# Patient Record
Sex: FEMALE | Race: OTHER | Employment: UNEMPLOYED | URBAN - METROPOLITAN AREA
[De-identification: names, ages, dates, MRNs, and addresses within clinical notes are randomized per-mention and may not be internally consistent; named-entity substitution may affect disease eponyms.]

---

## 2020-06-22 ENCOUNTER — HOSPITAL ENCOUNTER (OUTPATIENT)
Dept: GENERAL RADIOLOGY | Facility: HOSPITAL | Age: 55
Discharge: HOME | End: 2020-06-22
Attending: CHIROPRACTOR

## 2020-06-22 DIAGNOSIS — M25.561 RIGHT KNEE PAIN, UNSPECIFIED CHRONICITY: ICD-10-CM

## 2020-06-22 DIAGNOSIS — M25.551 RIGHT HIP PAIN: ICD-10-CM

## 2020-06-22 PROCEDURE — 73560 X-RAY EXAM OF KNEE 1 OR 2: CPT | Performed by: CHIROPRACTOR

## 2020-06-22 PROCEDURE — 73502 X-RAY EXAM HIP UNI 2-3 VIEWS: CPT | Performed by: CHIROPRACTOR

## 2020-06-26 ENCOUNTER — TELEPHONE (OUTPATIENT)
Dept: ORTHOPEDICS CLINIC | Facility: CLINIC | Age: 55
End: 2020-06-26

## 2020-06-26 NOTE — TELEPHONE ENCOUNTER
Patient being referred from Dr. Usman Choe for right hip pain, Patient had x-rays done on 6.22.20.  Please advise

## 2020-07-08 ENCOUNTER — OFFICE VISIT (OUTPATIENT)
Dept: ORTHOPEDICS CLINIC | Facility: CLINIC | Age: 55
End: 2020-07-08

## 2020-07-08 VITALS — HEART RATE: 87 BPM | OXYGEN SATURATION: 98 %

## 2020-07-08 DIAGNOSIS — M16.11 PRIMARY OSTEOARTHRITIS OF RIGHT HIP: Primary | ICD-10-CM

## 2020-07-08 PROCEDURE — 20611 DRAIN/INJ JOINT/BURSA W/US: CPT | Performed by: ORTHOPAEDIC SURGERY

## 2020-07-08 PROCEDURE — 99203 OFFICE O/P NEW LOW 30 MIN: CPT | Performed by: ORTHOPAEDIC SURGERY

## 2020-07-08 RX ORDER — TRIAMCINOLONE ACETONIDE 40 MG/ML
40 INJECTION, SUSPENSION INTRA-ARTICULAR; INTRAMUSCULAR ONCE
Status: COMPLETED | OUTPATIENT
Start: 2020-07-08 | End: 2020-07-08

## 2020-07-08 RX ORDER — CELECOXIB 200 MG/1
200 CAPSULE ORAL DAILY
COMMUNITY
Start: 2020-06-30 | End: 2021-01-07 | Stop reason: ALTCHOICE

## 2020-07-08 RX ORDER — AMITRIPTYLINE HYDROCHLORIDE 25 MG/1
25 TABLET, FILM COATED ORAL NIGHTLY
COMMUNITY
Start: 2020-06-30 | End: 2021-01-07 | Stop reason: ALTCHOICE

## 2020-07-08 RX ADMIN — TRIAMCINOLONE ACETONIDE 40 MG: 40 INJECTION, SUSPENSION INTRA-ARTICULAR; INTRAMUSCULAR at 17:50:00

## 2020-07-08 NOTE — PROCEDURES
After informed consent, the patient's right hip was marked, prepped with topical antiseptic, and locally anesthetized with skin refrigerant followed by injection of 1% lidocaine to create a skin wheal anteriorly at an insertion site a few fingerbreadths la

## 2020-07-08 NOTE — H&P
[facility] New Patient Note    CC: Patient presents with:  Hip Pain: c/o right hip pain       HPI: This 53year old female presents today with complaints of hip pain.   Patient reports that the pain has been present for the past few months, points to extremity:  • Inspection: skin intact, no lesions about the hip  • Palpation: Nontender palpation greater trochanter/bursa and IT band  • Range of motion: Passive hip flexion to 90 degrees with internal rotation just past 10 and external rotation to 10 cau Louis, which she started about a week ago. I did advise continuation of therapeutic exercise, stretching and strengthening as pain allows. We did discuss also the possibility of an intra-articular hip injection. They understand and agree to this.   Hip

## 2020-07-10 ENCOUNTER — TELEPHONE (OUTPATIENT)
Dept: ORTHOPEDICS CLINIC | Facility: CLINIC | Age: 55
End: 2020-07-10

## 2020-07-10 NOTE — TELEPHONE ENCOUNTER
Patient received hip injection on Wednesday 7.8. 20 with some relief but states the pain is back and is having issues walking again.  Please advise

## 2020-09-16 ENCOUNTER — OFFICE VISIT (OUTPATIENT)
Dept: ORTHOPEDICS CLINIC | Facility: CLINIC | Age: 55
End: 2020-09-16

## 2020-09-16 VITALS — OXYGEN SATURATION: 100 % | HEART RATE: 86 BPM

## 2020-09-16 DIAGNOSIS — M16.11 PRIMARY OSTEOARTHRITIS OF RIGHT HIP: Primary | ICD-10-CM

## 2020-09-16 PROCEDURE — 99213 OFFICE O/P EST LOW 20 MIN: CPT | Performed by: ORTHOPAEDIC SURGERY

## 2020-09-16 RX ORDER — CELECOXIB 200 MG/1
200 CAPSULE ORAL DAILY
Qty: 30 CAPSULE | Refills: 1 | Status: SHIPPED | OUTPATIENT
Start: 2020-09-16 | End: 2020-11-15

## 2020-09-16 NOTE — PROGRESS NOTES
[facility] Progress Note    Subjective: Patient returns for evaluation of right hip. She states that the injection we performed gave near 100% relief for 1 month. She was also taking Celebrex, but ran out 1 month ago.   She has been unable to get a appointment for a few weeks from now which would be 3 months since the date of the last injection. She and her son understand and agree to this plan.     MD Mick [facility]

## 2020-10-09 ENCOUNTER — OFFICE VISIT (OUTPATIENT)
Dept: ORTHOPEDICS CLINIC | Facility: CLINIC | Age: 55
End: 2020-10-09

## 2020-10-09 VITALS — SYSTOLIC BLOOD PRESSURE: 136 MMHG | DIASTOLIC BLOOD PRESSURE: 80 MMHG | HEART RATE: 76 BPM | OXYGEN SATURATION: 97 %

## 2020-10-09 DIAGNOSIS — M16.11 PRIMARY OSTEOARTHRITIS OF RIGHT HIP: Primary | ICD-10-CM

## 2020-10-09 PROCEDURE — 20611 DRAIN/INJ JOINT/BURSA W/US: CPT | Performed by: ORTHOPAEDIC SURGERY

## 2020-10-09 RX ORDER — TRIAMCINOLONE ACETONIDE 40 MG/ML
40 INJECTION, SUSPENSION INTRA-ARTICULAR; INTRAMUSCULAR ONCE
Status: COMPLETED | OUTPATIENT
Start: 2020-10-09 | End: 2020-10-09

## 2020-10-09 RX ADMIN — TRIAMCINOLONE ACETONIDE 40 MG: 40 INJECTION, SUSPENSION INTRA-ARTICULAR; INTRAMUSCULAR at 16:25:00

## 2020-11-12 ENCOUNTER — TELEPHONE (OUTPATIENT)
Dept: ORTHOPEDICS CLINIC | Facility: CLINIC | Age: 55
End: 2020-11-12

## 2020-11-12 NOTE — TELEPHONE ENCOUNTER
Pt son is calling her regarding her right hip pain now she is having issues on the front of the leg/ Please advise

## 2020-11-12 NOTE — TELEPHONE ENCOUNTER
Spoke with Moose Roa, pt's son, who stated that pt has been experiencing pain near the front of the hip for the past month. Pt has stopped taking Celebrex, but stated that it was helping much anymore. Pt is icing at night.  Pt stated the pain is throbbing, and

## 2020-11-15 NOTE — TELEPHONE ENCOUNTER
If she would like to come back in for evaluation/discussion we can set her up. If she had some relief from the last injection, even if short-lived, this would be helpful to confirm that the hip joint is the source of her pain.  If it is, discussion is 1)

## 2020-11-16 NOTE — TELEPHONE ENCOUNTER
Attempted to call Jessica Prince son [facility] regarding follow up on Dr. Waldemar Ash recommendations. Reached voicemail, left voicemail and provided a detail message with my call back contact information.

## 2020-12-01 ENCOUNTER — MED REC SCAN ONLY (OUTPATIENT)
Dept: ORTHOPEDICS CLINIC | Facility: CLINIC | Age: 55
End: 2020-12-01

## 2020-12-11 ENCOUNTER — TELEPHONE (OUTPATIENT)
Dept: ORTHOPEDICS CLINIC | Facility: CLINIC | Age: 55
End: 2020-12-11

## 2020-12-11 ENCOUNTER — OFFICE VISIT (OUTPATIENT)
Dept: ORTHOPEDICS CLINIC | Facility: CLINIC | Age: 55
End: 2020-12-11

## 2020-12-11 ENCOUNTER — HOSPITAL ENCOUNTER (OUTPATIENT)
Dept: GENERAL RADIOLOGY | Age: 55
Discharge: HOME | End: 2020-12-11
Attending: ORTHOPAEDIC SURGERY

## 2020-12-11 VITALS — BODY MASS INDEX: 26.29 KG/M2 | HEART RATE: 76 BPM | WEIGHT: 154 LBS | HEIGHT: 64 IN | OXYGEN SATURATION: 99 %

## 2020-12-11 DIAGNOSIS — M16.11 PRIMARY OSTEOARTHRITIS OF RIGHT HIP: Primary | ICD-10-CM

## 2020-12-11 DIAGNOSIS — M16.11 PRIMARY OSTEOARTHRITIS OF RIGHT HIP: ICD-10-CM

## 2020-12-11 PROCEDURE — 73502 X-RAY EXAM HIP UNI 2-3 VIEWS: CPT | Performed by: ORTHOPAEDIC SURGERY

## 2020-12-11 PROCEDURE — 3008F BODY MASS INDEX DOCD: CPT | Performed by: ORTHOPAEDIC SURGERY

## 2020-12-11 PROCEDURE — 99213 OFFICE O/P EST LOW 20 MIN: CPT | Performed by: ORTHOPAEDIC SURGERY

## 2020-12-11 NOTE — TELEPHONE ENCOUNTER
She is currently taking celecoxib (celebrex)  We can try a different antiinflammatory - meloxicam - if she would like.  It is the same class of medication as celebrex so she would have to stop the celebrex to take meloxicam

## 2020-12-11 NOTE — PROGRESS NOTES
[facility] Progress Note    Subjective: Patient returns for discussion of her right hip. She states that the pain is still bothering her significantly. Last injection given 2 months ago gave 40% relief that is still lasting to this point.   She also alleviate symptoms sufficiently. The patient has reasonably attempted nonsurgical treatments as mentioned above and remains limited in activities of daily living secondary to pain from hip arthritis.   I discussed risks and benefits of surgery, including b

## 2020-12-11 NOTE — TELEPHONE ENCOUNTER
Patient's son forgot to ask at his mother's appt today for a stronger pain med for her to use until surgery. The current pain meds are not working. Please call.

## 2020-12-14 NOTE — TELEPHONE ENCOUNTER
Called patient and son answer he's on Hippa , explained to him that meloxicam can be sent to the pharmacy for her to take instead of the Celebrex. patient's son is okay with that Patients's son understood.  Patient son stated she's in a lot of pain and would

## 2020-12-15 ENCOUNTER — TELEPHONE (OUTPATIENT)
Dept: ORTHOPEDICS CLINIC | Facility: CLINIC | Age: 55
End: 2020-12-15

## 2020-12-15 NOTE — TELEPHONE ENCOUNTER
Spoke with patient's son Will Fu, and notified him that Dr. Aliza Canales was still in the OR.  Pt's son notified that Dr. Aliza Canales has been made aware of the medication request. Pt's son clarified that the referral for the dentist is actually a dental clearance

## 2020-12-15 NOTE — TELEPHONE ENCOUNTER
Please fax a referral to the patient's dentist @ [phone]. Patient is to be scheduled for SX in January once her dentist clears her. Patient's on is also wondering if she can get a stronger pain medication.   The patient is in pain and is having a l

## 2020-12-16 RX ORDER — MELOXICAM 7.5 MG/1
7.5 TABLET ORAL DAILY
Qty: 30 TABLET | Refills: 0 | Status: SHIPPED | OUTPATIENT
Start: 2020-12-16 | End: 2021-01-15

## 2020-12-16 NOTE — TELEPHONE ENCOUNTER
Called patient to inform her RX has been sent to the pharmacy and its to take instead of the celebrex. Patient's son answered. Patient's son is looking for a code to get a quote for hip surgery.  Patient son also would like to know if his mother brings her

## 2020-12-28 ENCOUNTER — OFFICE VISIT (OUTPATIENT)
Dept: ORTHOPEDICS CLINIC | Facility: CLINIC | Age: 55
End: 2020-12-28

## 2020-12-28 VITALS — OXYGEN SATURATION: 100 % | HEART RATE: 76 BPM

## 2020-12-28 DIAGNOSIS — M16.11 PRIMARY OSTEOARTHRITIS OF RIGHT HIP: Primary | ICD-10-CM

## 2020-12-28 PROCEDURE — 99213 OFFICE O/P EST LOW 20 MIN: CPT | Performed by: ORTHOPAEDIC SURGERY

## 2020-12-28 NOTE — PROGRESS NOTES
Surgery Scheduling Sheet for Total Hip Arthroplasty  Name: Shu Pittman  : 1965    Procedure: Right AnteriorTotal Hip Arthroplasty   Diagnosis: Right Hip Arthritis   CPT Code: 07545  Anesthesia: Choice  Length of Surgery: 2 hours  Instruments:

## 2020-12-28 NOTE — PROGRESS NOTES
[facility] Progress Note    Subjective: Patient returns for discussion of hip replacement surgery on the right. She states that the pain remains activity limiting.   Pain is in the groin, radiates the proximal lateral thigh as well as down the anteri uploaded to her chart. We will obtain some additional labs including MRSA. She is looking to proceed with surgery at the end of January. Preoperative binder was reviewed with the patient today. All questions were answered for her and her son.   We will

## 2021-01-07 RX ORDER — ACETAMINOPHEN 500 MG
1000 TABLET ORAL ONCE
Status: CANCELLED | OUTPATIENT
Start: 2021-01-07 | End: 2021-01-07

## 2021-01-23 ENCOUNTER — LABORATORY ENCOUNTER (OUTPATIENT)
Dept: LAB | Age: 56
End: 2021-01-23
Attending: ORTHOPAEDIC SURGERY
Payer: COMMERCIAL

## 2021-01-23 ENCOUNTER — LAB ENCOUNTER (OUTPATIENT)
Dept: LAB | Age: 56
End: 2021-01-23
Attending: ORTHOPAEDIC SURGERY
Payer: COMMERCIAL

## 2021-01-23 DIAGNOSIS — Z01.818 PRE-OP TESTING: ICD-10-CM

## 2021-01-23 LAB
ANTIBODY SCREEN: NEGATIVE
RH BLOOD TYPE: POSITIVE

## 2021-01-23 PROCEDURE — 86850 RBC ANTIBODY SCREEN: CPT

## 2021-01-23 PROCEDURE — 86901 BLOOD TYPING SEROLOGIC RH(D): CPT

## 2021-01-23 PROCEDURE — 86900 BLOOD TYPING SEROLOGIC ABO: CPT

## 2021-01-25 ENCOUNTER — ANESTHESIA EVENT (OUTPATIENT)
Dept: SURGERY | Facility: HOSPITAL | Age: 56
DRG: 470 | End: 2021-01-25
Payer: COMMERCIAL

## 2021-01-25 LAB — SARS-COV-2 RNA RESP QL NAA+PROBE: NOT DETECTED

## 2021-01-25 RX ORDER — MELOXICAM 7.5 MG/1
7.5 TABLET ORAL DAILY
Status: ON HOLD | COMMUNITY
End: 2021-01-27

## 2021-01-26 ENCOUNTER — ANESTHESIA (OUTPATIENT)
Dept: SURGERY | Facility: HOSPITAL | Age: 56
DRG: 470 | End: 2021-01-26
Payer: COMMERCIAL

## 2021-01-26 ENCOUNTER — HOSPITAL ENCOUNTER (INPATIENT)
Facility: HOSPITAL | Age: 56
LOS: 1 days | Discharge: HOME HEALTH | DRG: 470 | End: 2021-01-27
Attending: ORTHOPAEDIC SURGERY | Admitting: ORTHOPAEDIC SURGERY
Payer: COMMERCIAL

## 2021-01-26 ENCOUNTER — APPOINTMENT (OUTPATIENT)
Dept: GENERAL RADIOLOGY | Facility: HOSPITAL | Age: 56
DRG: 470 | End: 2021-01-26
Attending: ORTHOPAEDIC SURGERY
Payer: COMMERCIAL

## 2021-01-26 ENCOUNTER — APPOINTMENT (OUTPATIENT)
Dept: GENERAL RADIOLOGY | Facility: HOSPITAL | Age: 56
DRG: 470 | End: 2021-01-26
Attending: NURSE PRACTITIONER
Payer: COMMERCIAL

## 2021-01-26 DIAGNOSIS — Z01.818 PRE-OP TESTING: Primary | ICD-10-CM

## 2021-01-26 DIAGNOSIS — M16.11 PRIMARY OSTEOARTHRITIS OF RIGHT HIP: ICD-10-CM

## 2021-01-26 PROCEDURE — 99252 IP/OBS CONSLTJ NEW/EST SF 35: CPT | Performed by: STUDENT IN AN ORGANIZED HEALTH CARE EDUCATION/TRAINING PROGRAM

## 2021-01-26 PROCEDURE — 27130 TOTAL HIP ARTHROPLASTY: CPT | Performed by: NURSE PRACTITIONER

## 2021-01-26 PROCEDURE — 73501 X-RAY EXAM HIP UNI 1 VIEW: CPT | Performed by: NURSE PRACTITIONER

## 2021-01-26 PROCEDURE — 0SR90JA REPLACEMENT OF RIGHT HIP JOINT WITH SYNTHETIC SUBSTITUTE, UNCEMENTED, OPEN APPROACH: ICD-10-PCS | Performed by: ORTHOPAEDIC SURGERY

## 2021-01-26 PROCEDURE — 76000 FLUOROSCOPY <1 HR PHYS/QHP: CPT | Performed by: ORTHOPAEDIC SURGERY

## 2021-01-26 PROCEDURE — 3E0R3BZ INTRODUCTION OF ANESTHETIC AGENT INTO SPINAL CANAL, PERCUTANEOUS APPROACH: ICD-10-PCS | Performed by: ANESTHESIOLOGY

## 2021-01-26 PROCEDURE — 27130 TOTAL HIP ARTHROPLASTY: CPT | Performed by: ORTHOPAEDIC SURGERY

## 2021-01-26 DEVICE — BONE SCREW 6.5X40 SELF-TAP: Type: IMPLANTABLE DEVICE | Site: HIP | Status: FUNCTIONAL

## 2021-01-26 DEVICE — BONE SCREW 6.5X20 SELF-TAP: Type: IMPLANTABLE DEVICE | Site: HIP | Status: FUNCTIONAL

## 2021-01-26 RX ORDER — SENNOSIDES 8.6 MG
17.2 TABLET ORAL NIGHTLY
Status: DISCONTINUED | OUTPATIENT
Start: 2021-01-26 | End: 2021-01-27

## 2021-01-26 RX ORDER — HYDROMORPHONE HYDROCHLORIDE 1 MG/ML
0.4 INJECTION, SOLUTION INTRAMUSCULAR; INTRAVENOUS; SUBCUTANEOUS EVERY 5 MIN PRN
Status: ACTIVE | OUTPATIENT
Start: 2021-01-26 | End: 2021-01-26

## 2021-01-26 RX ORDER — SODIUM CHLORIDE, SODIUM LACTATE, POTASSIUM CHLORIDE, CALCIUM CHLORIDE 600; 310; 30; 20 MG/100ML; MG/100ML; MG/100ML; MG/100ML
INJECTION, SOLUTION INTRAVENOUS CONTINUOUS
Status: DISCONTINUED | OUTPATIENT
Start: 2021-01-26 | End: 2021-01-27

## 2021-01-26 RX ORDER — NALOXONE HYDROCHLORIDE 0.4 MG/ML
80 INJECTION, SOLUTION INTRAMUSCULAR; INTRAVENOUS; SUBCUTANEOUS AS NEEDED
Status: DISCONTINUED | OUTPATIENT
Start: 2021-01-26 | End: 2021-01-26 | Stop reason: HOSPADM

## 2021-01-26 RX ORDER — MIDAZOLAM HYDROCHLORIDE 1 MG/ML
1 INJECTION INTRAMUSCULAR; INTRAVENOUS EVERY 5 MIN PRN
Status: DISCONTINUED | OUTPATIENT
Start: 2021-01-26 | End: 2021-01-26 | Stop reason: HOSPADM

## 2021-01-26 RX ORDER — MIDAZOLAM HYDROCHLORIDE 1 MG/ML
INJECTION INTRAMUSCULAR; INTRAVENOUS AS NEEDED
Status: DISCONTINUED | OUTPATIENT
Start: 2021-01-26 | End: 2021-01-26 | Stop reason: SURG

## 2021-01-26 RX ORDER — ASPIRIN 325 MG
325 TABLET, DELAYED RELEASE (ENTERIC COATED) ORAL 2 TIMES DAILY
Status: DISCONTINUED | OUTPATIENT
Start: 2021-01-27 | End: 2021-01-27

## 2021-01-26 RX ORDER — DOCUSATE SODIUM 100 MG/1
100 CAPSULE, LIQUID FILLED ORAL 2 TIMES DAILY
Status: DISCONTINUED | OUTPATIENT
Start: 2021-01-26 | End: 2021-01-27

## 2021-01-26 RX ORDER — LIDOCAINE HYDROCHLORIDE 10 MG/ML
INJECTION, SOLUTION EPIDURAL; INFILTRATION; INTRACAUDAL; PERINEURAL AS NEEDED
Status: DISCONTINUED | OUTPATIENT
Start: 2021-01-26 | End: 2021-01-26 | Stop reason: SURG

## 2021-01-26 RX ORDER — TRANEXAMIC ACID 10 MG/ML
1000 INJECTION, SOLUTION INTRAVENOUS ONCE
Status: COMPLETED | OUTPATIENT
Start: 2021-01-26 | End: 2021-01-26

## 2021-01-26 RX ORDER — HYDROMORPHONE HYDROCHLORIDE 1 MG/ML
0.4 INJECTION, SOLUTION INTRAMUSCULAR; INTRAVENOUS; SUBCUTANEOUS EVERY 2 HOUR PRN
Status: DISCONTINUED | OUTPATIENT
Start: 2021-01-26 | End: 2021-01-27

## 2021-01-26 RX ORDER — ACETAMINOPHEN 500 MG
1000 TABLET ORAL ONCE AS NEEDED
Status: DISCONTINUED | OUTPATIENT
Start: 2021-01-26 | End: 2021-01-26 | Stop reason: HOSPADM

## 2021-01-26 RX ORDER — DIPHENHYDRAMINE HYDROCHLORIDE 50 MG/ML
12.5 INJECTION INTRAMUSCULAR; INTRAVENOUS EVERY 4 HOURS PRN
Status: DISCONTINUED | OUTPATIENT
Start: 2021-01-26 | End: 2021-01-27

## 2021-01-26 RX ORDER — ACETAMINOPHEN 325 MG/1
TABLET ORAL
Status: COMPLETED
Start: 2021-01-26 | End: 2021-01-26

## 2021-01-26 RX ORDER — BISACODYL 10 MG
10 SUPPOSITORY, RECTAL RECTAL
Status: DISCONTINUED | OUTPATIENT
Start: 2021-01-26 | End: 2021-01-27

## 2021-01-26 RX ORDER — DEXAMETHASONE SODIUM PHOSPHATE 4 MG/ML
VIAL (ML) INJECTION AS NEEDED
Status: DISCONTINUED | OUTPATIENT
Start: 2021-01-26 | End: 2021-01-26 | Stop reason: SURG

## 2021-01-26 RX ORDER — CEFAZOLIN SODIUM/WATER 2 G/20 ML
2 SYRINGE (ML) INTRAVENOUS ONCE
Status: COMPLETED | OUTPATIENT
Start: 2021-01-26 | End: 2021-01-26

## 2021-01-26 RX ORDER — POLYETHYLENE GLYCOL 3350 17 G/17G
17 POWDER, FOR SOLUTION ORAL DAILY PRN
Status: DISCONTINUED | OUTPATIENT
Start: 2021-01-26 | End: 2021-01-27

## 2021-01-26 RX ORDER — DIPHENHYDRAMINE HCL 25 MG
25 CAPSULE ORAL EVERY 4 HOURS PRN
Status: DISCONTINUED | OUTPATIENT
Start: 2021-01-26 | End: 2021-01-27

## 2021-01-26 RX ORDER — MEPERIDINE HYDROCHLORIDE 25 MG/ML
12.5 INJECTION INTRAMUSCULAR; INTRAVENOUS; SUBCUTANEOUS AS NEEDED
Status: DISCONTINUED | OUTPATIENT
Start: 2021-01-26 | End: 2021-01-26 | Stop reason: HOSPADM

## 2021-01-26 RX ORDER — ACETAMINOPHEN 325 MG/1
650 TABLET ORAL 4 TIMES DAILY
Status: DISCONTINUED | OUTPATIENT
Start: 2021-01-26 | End: 2021-01-27

## 2021-01-26 RX ORDER — DEXAMETHASONE SODIUM PHOSPHATE 10 MG/ML
8 INJECTION, SOLUTION INTRAMUSCULAR; INTRAVENOUS ONCE
Status: COMPLETED | OUTPATIENT
Start: 2021-01-27 | End: 2021-01-27

## 2021-01-26 RX ORDER — ONDANSETRON 2 MG/ML
4 INJECTION INTRAMUSCULAR; INTRAVENOUS AS NEEDED
Status: DISCONTINUED | OUTPATIENT
Start: 2021-01-26 | End: 2021-01-26 | Stop reason: HOSPADM

## 2021-01-26 RX ORDER — SODIUM CHLORIDE, SODIUM LACTATE, POTASSIUM CHLORIDE, CALCIUM CHLORIDE 600; 310; 30; 20 MG/100ML; MG/100ML; MG/100ML; MG/100ML
INJECTION, SOLUTION INTRAVENOUS CONTINUOUS
Status: DISCONTINUED | OUTPATIENT
Start: 2021-01-26 | End: 2021-01-26 | Stop reason: HOSPADM

## 2021-01-26 RX ORDER — PROCHLORPERAZINE EDISYLATE 5 MG/ML
10 INJECTION INTRAMUSCULAR; INTRAVENOUS EVERY 6 HOURS PRN
Status: DISCONTINUED | OUTPATIENT
Start: 2021-01-26 | End: 2021-01-27

## 2021-01-26 RX ORDER — HYDROMORPHONE HYDROCHLORIDE 1 MG/ML
0.4 INJECTION, SOLUTION INTRAMUSCULAR; INTRAVENOUS; SUBCUTANEOUS EVERY 5 MIN PRN
Status: DISCONTINUED | OUTPATIENT
Start: 2021-01-26 | End: 2021-01-26 | Stop reason: HOSPADM

## 2021-01-26 RX ORDER — EPHEDRINE SULFATE 50 MG/ML
INJECTION INTRAVENOUS AS NEEDED
Status: DISCONTINUED | OUTPATIENT
Start: 2021-01-26 | End: 2021-01-26 | Stop reason: SURG

## 2021-01-26 RX ORDER — ACETAMINOPHEN 500 MG
500 TABLET ORAL EVERY 6 HOURS PRN
Status: ON HOLD | COMMUNITY
End: 2021-01-27

## 2021-01-26 RX ORDER — HYDROCODONE BITARTRATE AND ACETAMINOPHEN 10; 325 MG/1; MG/1
2 TABLET ORAL AS NEEDED
Status: DISCONTINUED | OUTPATIENT
Start: 2021-01-26 | End: 2021-01-26 | Stop reason: HOSPADM

## 2021-01-26 RX ORDER — ONDANSETRON 2 MG/ML
4 INJECTION INTRAMUSCULAR; INTRAVENOUS EVERY 4 HOURS PRN
Status: DISCONTINUED | OUTPATIENT
Start: 2021-01-26 | End: 2021-01-27

## 2021-01-26 RX ORDER — HYDROMORPHONE HYDROCHLORIDE 1 MG/ML
0.8 INJECTION, SOLUTION INTRAMUSCULAR; INTRAVENOUS; SUBCUTANEOUS EVERY 2 HOUR PRN
Status: DISCONTINUED | OUTPATIENT
Start: 2021-01-26 | End: 2021-01-27

## 2021-01-26 RX ORDER — OXYCODONE HYDROCHLORIDE 5 MG/1
5 TABLET ORAL EVERY 4 HOURS PRN
Status: DISCONTINUED | OUTPATIENT
Start: 2021-01-26 | End: 2021-01-27

## 2021-01-26 RX ORDER — KETOROLAC TROMETHAMINE 15 MG/ML
15 INJECTION, SOLUTION INTRAMUSCULAR; INTRAVENOUS EVERY 6 HOURS
Status: COMPLETED | OUTPATIENT
Start: 2021-01-26 | End: 2021-01-27

## 2021-01-26 RX ORDER — ONDANSETRON 2 MG/ML
INJECTION INTRAMUSCULAR; INTRAVENOUS AS NEEDED
Status: DISCONTINUED | OUTPATIENT
Start: 2021-01-26 | End: 2021-01-26 | Stop reason: SURG

## 2021-01-26 RX ORDER — KETOROLAC TROMETHAMINE 30 MG/ML
INJECTION, SOLUTION INTRAMUSCULAR; INTRAVENOUS AS NEEDED
Status: DISCONTINUED | OUTPATIENT
Start: 2021-01-26 | End: 2021-01-26 | Stop reason: SURG

## 2021-01-26 RX ORDER — TRAMADOL HYDROCHLORIDE 50 MG/1
50 TABLET ORAL EVERY 6 HOURS
Status: DISCONTINUED | OUTPATIENT
Start: 2021-01-26 | End: 2021-01-27

## 2021-01-26 RX ORDER — HYDROCODONE BITARTRATE AND ACETAMINOPHEN 10; 325 MG/1; MG/1
1 TABLET ORAL AS NEEDED
Status: DISCONTINUED | OUTPATIENT
Start: 2021-01-26 | End: 2021-01-26 | Stop reason: HOSPADM

## 2021-01-26 RX ORDER — KETAMINE HYDROCHLORIDE 50 MG/ML
INJECTION, SOLUTION, CONCENTRATE INTRAMUSCULAR; INTRAVENOUS AS NEEDED
Status: DISCONTINUED | OUTPATIENT
Start: 2021-01-26 | End: 2021-01-26 | Stop reason: SURG

## 2021-01-26 RX ORDER — DIPHENHYDRAMINE HYDROCHLORIDE 50 MG/ML
25 INJECTION INTRAMUSCULAR; INTRAVENOUS ONCE AS NEEDED
Status: ACTIVE | OUTPATIENT
Start: 2021-01-26 | End: 2021-01-26

## 2021-01-26 RX ORDER — SCOLOPAMINE TRANSDERMAL SYSTEM 1 MG/1
1 PATCH, EXTENDED RELEASE TRANSDERMAL ONCE
Status: DISCONTINUED | OUTPATIENT
Start: 2021-01-26 | End: 2021-01-27

## 2021-01-26 RX ORDER — BUPIVACAINE HYDROCHLORIDE 5 MG/ML
INJECTION, SOLUTION EPIDURAL; INTRACAUDAL AS NEEDED
Status: DISCONTINUED | OUTPATIENT
Start: 2021-01-26 | End: 2021-01-26 | Stop reason: SURG

## 2021-01-26 RX ORDER — OXYCODONE HYDROCHLORIDE 10 MG/1
10 TABLET ORAL EVERY 4 HOURS PRN
Status: DISCONTINUED | OUTPATIENT
Start: 2021-01-26 | End: 2021-01-27

## 2021-01-26 RX ORDER — CEFAZOLIN SODIUM/WATER 2 G/20 ML
2 SYRINGE (ML) INTRAVENOUS EVERY 8 HOURS
Status: COMPLETED | OUTPATIENT
Start: 2021-01-26 | End: 2021-01-27

## 2021-01-26 RX ORDER — ACETAMINOPHEN 325 MG/1
650 TABLET ORAL ONCE AS NEEDED
Status: COMPLETED | OUTPATIENT
Start: 2021-01-26 | End: 2021-01-26

## 2021-01-26 RX ORDER — SODIUM PHOSPHATE, DIBASIC AND SODIUM PHOSPHATE, MONOBASIC 7; 19 G/133ML; G/133ML
1 ENEMA RECTAL ONCE AS NEEDED
Status: DISCONTINUED | OUTPATIENT
Start: 2021-01-26 | End: 2021-01-27

## 2021-01-26 RX ORDER — MAGNESIUM HYDROXIDE 1200 MG/15ML
LIQUID ORAL CONTINUOUS PRN
Status: COMPLETED | OUTPATIENT
Start: 2021-01-26 | End: 2021-01-26

## 2021-01-26 RX ORDER — NALOXONE HYDROCHLORIDE 0.4 MG/ML
80 INJECTION, SOLUTION INTRAMUSCULAR; INTRAVENOUS; SUBCUTANEOUS AS NEEDED
Status: ACTIVE | OUTPATIENT
Start: 2021-01-26 | End: 2021-01-26

## 2021-01-26 RX ADMIN — KETOROLAC TROMETHAMINE 30 MG: 30 INJECTION, SOLUTION INTRAMUSCULAR; INTRAVENOUS at 10:32:00

## 2021-01-26 RX ADMIN — LIDOCAINE HYDROCHLORIDE 2 ML: 10 INJECTION, SOLUTION EPIDURAL; INFILTRATION; INTRACAUDAL; PERINEURAL at 07:10:00

## 2021-01-26 RX ADMIN — MIDAZOLAM HYDROCHLORIDE 2 MG: 1 INJECTION INTRAMUSCULAR; INTRAVENOUS at 07:09:00

## 2021-01-26 RX ADMIN — KETAMINE HYDROCHLORIDE 20 MG: 50 INJECTION, SOLUTION, CONCENTRATE INTRAMUSCULAR; INTRAVENOUS at 07:09:00

## 2021-01-26 RX ADMIN — TRANEXAMIC ACID 1000 MG: 10 INJECTION, SOLUTION INTRAVENOUS at 07:42:00

## 2021-01-26 RX ADMIN — EPHEDRINE SULFATE 10 MG: 50 INJECTION INTRAVENOUS at 08:50:00

## 2021-01-26 RX ADMIN — CEFAZOLIN SODIUM/WATER 2 G: 2 G/20 ML SYRINGE (ML) INTRAVENOUS at 07:32:00

## 2021-01-26 RX ADMIN — SODIUM CHLORIDE, SODIUM LACTATE, POTASSIUM CHLORIDE, CALCIUM CHLORIDE: 600; 310; 30; 20 INJECTION, SOLUTION INTRAVENOUS at 09:32:00

## 2021-01-26 RX ADMIN — EPHEDRINE SULFATE 5 MG: 50 INJECTION INTRAVENOUS at 09:21:00

## 2021-01-26 RX ADMIN — ONDANSETRON 4 MG: 2 INJECTION INTRAMUSCULAR; INTRAVENOUS at 09:57:00

## 2021-01-26 RX ADMIN — DEXAMETHASONE SODIUM PHOSPHATE 8 MG: 4 MG/ML VIAL (ML) INJECTION at 07:53:00

## 2021-01-26 RX ADMIN — BUPIVACAINE HYDROCHLORIDE 3 ML: 5 INJECTION, SOLUTION EPIDURAL; INTRACAUDAL at 07:10:00

## 2021-01-26 NOTE — PROGRESS NOTES
No PCP listed. No medical hx noted. Dr Cyndi Vasquez paged with new consult. Dr Luann Miller on call. Awaiting call back.

## 2021-01-26 NOTE — CONSULTS
[facility]  [facility] Patient Status:  Inpatient    1965 MRN [id number]   [facility] 3SW-A Attending Bethany Miller MD   Hosp Day # 0 PCP None Pcp     Reason for consult: MEdical Mgt    Requested by: Dr Torres Tello Regular rate and rhythm. No murmurs, rubs or gallops. Equal pulses. Chest and Back: No tenderness or deformity. Abdomen: Soft, nontender, nondistended. Positive bowel sounds. No rebound, guarding or organomegaly.   Neurologic: No focal neurological defi

## 2021-01-26 NOTE — ANESTHESIA PREPROCEDURE EVALUATION
PRE-OP EVALUATION    Patient Name: Kraig Alvarado    Pre-op Diagnosis: Primary osteoarthritis of right hip [M16.11]    Procedure(s):  Right Anterior Total Hip Arthroplasty    Surgeon(s) and Role:     Nena Newell MD - Primary    Pre-op vitals revie Pulmonary      Breath sounds clear to auscultation bilaterally. Other findings            ASA: 2   Plan: general and spinal  NPO status verified and patient meets guidelines.     Post-procedure pain management plan discussed with surgeon and p

## 2021-01-26 NOTE — ANESTHESIA POSTPROCEDURE EVALUATION
Meagan Reed 79 Patient Status:  Inpatient   Age/Gender 63year old female MRN [id number]   [facility] SURGERY Attending Donavan Mcburney, MD   Hosp Day # 0 PCP None Pcp       Anesthesia Post-op Note    Procedure(s):  Right

## 2021-01-26 NOTE — OPERATIVE REPORT
[facility] REPORT - [id number]                        -----------------------------------------------------------------------  [facility]: [id number] treatment sought hip  replacement    TECHNIQUE:    After informed consent, the right lower extremity was marked. Patient  was brought to the operating room where anesthesia was induced.   Patient  was placed supine on the [place] table with the operative side until the lesser  trochanter was identified. The capsular limb was retracted posteriorly  as the reflected head of rectus tendon was partially dissected  subperiosteally off the anterior acetabular rim.   Next a deep Charnley  retractor was placed, and tra further sinking of the broach. It had good axial  control as well. We trialed with a high offset neck and +0 head. Following reduction of the hip, fluoroscopy was used to verify stem fit  as well as restoration of leg length and offset.   The hip was ex

## 2021-01-26 NOTE — PHYSICAL THERAPY NOTE
PHYSICAL THERAPY HIP EVALUATION - INPATIENT     Room Number: 356/356-A  Evaluation Date: 1/26/2021  Type of Evaluation: Initial  Physician Order: PT Eval and Treat    Presenting Problem: R JANIS 1/26/21  Reason for Therapy: Mobility Dysfunction and Discharge Fair -    ADDITIONAL TESTS                    ACTIVITY TOLERANCE           BP: 114/66         Patient Position: Sitting    O2 WALK          AM-PAC '6-Clicks' INPATIENT SHORT FORM - BASIC MOBILITY  How much difficulty does the patient currently have. ..  - training  Transfer training    Patient End of Session: Up in chair;Needs met;Call light within reach; All patient questions and concerns addressed;SCDs in place; Ice applied; Alarm set; Family present    ASSESSMENT   Patient is a 63year old female admitted on verbalizes and/or demonstrates all precautions and safety concerns independently   Goal #6        Goal Comments: Goals established on 1/26/2021    PPE worn by therapist throughout session, surgical mask and gloves, goggles.  Surgical mask worn by patient an

## 2021-01-26 NOTE — PLAN OF CARE
Pt a&O. On room air. Encouraged use of incentive spirometer and ankle pump exercises every hour while awake. Scds to BLE. Tolerating regular diet. Las tMb 1/25/21. Voiding w/o difficulty. Pain managed with current medications.  Pt was already assisted up to

## 2021-01-26 NOTE — ANESTHESIA PROCEDURE NOTES
Spinal Block  Performed by: Edna Alberto CRNA  Authorized by: Wilder Soni MD       General Information and Staff    Start Time:   Anesthesiologist: Wilder Soni MD  CRNA:  Edna Alberto CRNA  Performed by:  CRNA  Site identifica

## 2021-01-27 VITALS
TEMPERATURE: 98 F | SYSTOLIC BLOOD PRESSURE: 122 MMHG | BODY MASS INDEX: 28.56 KG/M2 | HEIGHT: 62 IN | OXYGEN SATURATION: 100 % | RESPIRATION RATE: 16 BRPM | WEIGHT: 155.19 LBS | HEART RATE: 61 BPM | DIASTOLIC BLOOD PRESSURE: 75 MMHG

## 2021-01-27 LAB
HCT VFR BLD AUTO: 27.3 %
HGB BLD-MCNC: 9 G/DL

## 2021-01-27 PROCEDURE — 99024 POSTOP FOLLOW-UP VISIT: CPT | Performed by: ORTHOPAEDIC SURGERY

## 2021-01-27 RX ORDER — PANTOPRAZOLE SODIUM 40 MG/1
40 TABLET, DELAYED RELEASE ORAL DAILY
Qty: 40 TABLET | Refills: 0 | Status: SHIPPED | OUTPATIENT
Start: 2021-01-27 | End: 2021-03-08

## 2021-01-27 RX ORDER — CELECOXIB 200 MG/1
200 CAPSULE ORAL DAILY
Qty: 30 CAPSULE | Refills: 0 | Status: SHIPPED | OUTPATIENT
Start: 2021-01-27 | End: 2021-02-26

## 2021-01-27 RX ORDER — OXYCODONE HYDROCHLORIDE 5 MG/1
5 TABLET ORAL EVERY 4 HOURS PRN
Qty: 20 TABLET | Refills: 0 | Status: SHIPPED | OUTPATIENT
Start: 2021-01-27 | End: 2021-03-12

## 2021-01-27 RX ORDER — PSEUDOEPHEDRINE HCL 30 MG
100 TABLET ORAL 2 TIMES DAILY
Qty: 30 CAPSULE | Refills: 0 | Status: SHIPPED | OUTPATIENT
Start: 2021-01-27 | End: 2021-03-12

## 2021-01-27 RX ORDER — ACETAMINOPHEN 500 MG
1000 TABLET ORAL EVERY 8 HOURS
Qty: 90 TABLET | Refills: 0 | Status: SHIPPED | OUTPATIENT
Start: 2021-01-27 | End: 2021-02-11

## 2021-01-27 RX ORDER — TRAMADOL HYDROCHLORIDE 50 MG/1
50 TABLET ORAL EVERY 8 HOURS
Qty: 45 TABLET | Refills: 0 | Status: SHIPPED | OUTPATIENT
Start: 2021-01-27 | End: 2021-02-11

## 2021-01-27 RX ORDER — ASPIRIN 325 MG
325 TABLET ORAL 2 TIMES DAILY
Qty: 80 TABLET | Refills: 0 | Status: SHIPPED | OUTPATIENT
Start: 2021-01-27 | End: 2021-03-08

## 2021-01-27 NOTE — PROGRESS NOTES
Pt last void was 1850 last night, doesn't have any urge at present. Bladder scan done- 252 ml. Will cont to monitor.

## 2021-01-27 NOTE — PROGRESS NOTES
[facility] Progress Note    Subjective: No acute events. Pain well controlled. Ambulated with therapy, feels that the pain she had preoperatively is different from the pain she has now postoperatively, which is well controlled.   Denies nausea or vomiting,

## 2021-01-27 NOTE — PROGRESS NOTES
SPOke with Wayne Matias in [place], she indicates that pt might need to do outpt PT due to insurance. She spoke with pt's son, he states he is aware of plan. Waiting on wheelchair now for DC.

## 2021-01-27 NOTE — PHYSICAL THERAPY NOTE
PHYSICAL THERAPY TREATMENT NOTE - INPATIENT    Room Number: 356/356-A     Session: 1    Number of Visits to Meet Established Goals: 3    Presenting Problem: R JANIS 1/26/21    History related to current admission: Pt is a 63year old female admitted for sue 11.2%   Standardized Score (AM-PAC Scale): 56.93   CMS Modifier (G-Code): CI    FUNCTIONAL ABILITY STATUS  Gait Assessment   Gait Assistance: Modified independent  Distance (ft): 150  Assistive Device: Rolling walker  Pattern: R Decreased stance time  Kermit Martinez with home health PT     PLAN  PT Treatment Plan: Bed mobility; Body mechanics; Energy conservation;Patient education;Gait training;Strengthening;Transfer training  Rehab Potential : Good  Frequency (Obs): Daily    CURRENT GOALS   Goal #1  Patient is able to

## 2021-01-27 NOTE — CM/SW NOTE
MSW spoke with RN. Patient discussed a pre-op plan but was informed that the [place] [id number] agency did not accept her insurance. Multiple referrals sent in Aidin. Will need to secure an agency for anticipated discharge tomorrow.     Linda Mir, JACEW
Referral for [facility] sent in Aidin. Will need appropriate HH order uploaded to send to the [facility] agencies.
Referrals [facility] and still waiting to see if any will accept.     Violet Cerda LCSW  /Discharge Planner  [phone]
Sw spoke to son Ashley Yeager, he was able to get OP appointment for pt tomorrow at [facility] location.     [employer] Aly, [facility]  /Discharge Planner  [phone]
Unable to find [place] [id number] that will accept patient's insurance. PT gave pt order for OP therapy and exercised to do while waiting to get in to OP therapy. PT also gave pt list of OP PT locations affiliated with [facility].     Nigel Yousif LCSW  /Disc
Negative

## 2021-01-27 NOTE — PLAN OF CARE
Pt has adequate relief from scheduled oral and Toradol IV. VSS, on RA. Uses IS and do ankle exercises as directed. Pt moving well, ambulatory to toilet and in halls with min assist. Plan is dc home with [facility] today.

## 2021-01-27 NOTE — PLAN OF CARE
Pt A & O x4, on RA. /IS. SCDs. ASA 325mg BID. Regular diet. Pt c/o dizziness when sitting and lying in bed. BP stable 120/76. Pt states she vomited x 1 after lunch but feels better now and dizziness is subsiding.  Pt and son watched DC video, went over

## 2021-01-27 NOTE — PROGRESS NOTES
Spoke with Magui Welch in [address] [place] pt she will call her once she hears back from home health she will call pt. Outpt PT order given to pt and pt reminded to do her exercises at home BID. Pt still c/o nausea, gave ginger ale and crackers.  Removed scop patch

## 2021-01-27 NOTE — OCCUPATIONAL THERAPY NOTE
OCCUPATIONAL THERAPY QUICK EVALUATION - INPATIENT    Room Number: 356/356-A  Evaluation Date: 1/27/2021     Type of Evaluation: Quick Eval  Presenting Problem: s/p R JANIS 1/26/21    Physician Order: IP Consult to Occupational Therapy  Reason for Therapy:  A ACTIVITY TOLERANCE                         O2 SATURATIONS                ACTIVITIES OF DAILY LIVING ASSESSMENT  AM-PAC ‘6-Clicks’ Inpatient Daily Activity Short Form   How much help from another person does the patient currently need…  -   Putting on a chair;Needs met;Call light within reach;RN aware of session/findings; All patient questions and concerns addressed;SCDs in place; Ice applied; Alarm set    ASSESSMENT     Patient is a 63year old female admitted 1/26/2021 for elective R JANIS 1/26/21.  Patient s ---------------------------------------------------------------------------------------------------------  PPE worn by therapist this session: Surgical mask, goggles, gloves  PPE worn by patient this session: Surgical mask

## 2021-01-28 ENCOUNTER — TELEPHONE (OUTPATIENT)
Dept: PHYSICAL THERAPY | Facility: HOSPITAL | Age: 56
End: 2021-01-28

## 2021-01-28 ENCOUNTER — OFFICE VISIT (OUTPATIENT)
Dept: PHYSICAL THERAPY | Age: 56
End: 2021-01-28
Attending: ORTHOPAEDIC SURGERY
Payer: COMMERCIAL

## 2021-01-28 DIAGNOSIS — M16.11 PRIMARY OSTEOARTHRITIS OF RIGHT HIP: ICD-10-CM

## 2021-01-28 PROCEDURE — 97161 PT EVAL LOW COMPLEX 20 MIN: CPT

## 2021-01-28 PROCEDURE — 97140 MANUAL THERAPY 1/> REGIONS: CPT

## 2021-01-28 NOTE — PROGRESS NOTES
POST-OP HIP EVALUATION:   Referring Physician: Dr. Kenn Cosby  Diagnosis: s/p R JANIS (anterior approach) 1/26/21     Date of Service: 1/28/2021     PATIENT SUMMARY   Enrique Myers is a 63year old female who presents to therapy today s/p elective R JANIS ( tasks. Pt and PT discussed evaluation findings, pathology, POC and HEP. Pt voiced understanding and performs HEP correctly without reported pain. Skilled Physical Therapy is medically necessary to address the above impairments and reach functional goals. hip for edema control x5 min; PROM R hip flex (to 90), IR to tolerance x4 min     Charges: PT Eval Low Complexity, manual x1      Total Timed Treatment: 15 min     Total Treatment Time: 45 min     PLAN OF CARE:    Goals: (To be met in 12 visits)   · Pt vivian care.    X___________________________________________________ Date____________________    Certification From: 8/66/9839  To:4/28/2021

## 2021-01-29 ENCOUNTER — TELEPHONE (OUTPATIENT)
Dept: ORTHOPEDICS CLINIC | Facility: CLINIC | Age: 56
End: 2021-01-29

## 2021-01-29 RX ORDER — ONDANSETRON 4 MG/1
4 TABLET, ORALLY DISINTEGRATING ORAL EVERY 8 HOURS PRN
Qty: 10 TABLET | Refills: 0 | Status: SHIPPED | OUTPATIENT
Start: 2021-01-29 | End: 2021-03-12

## 2021-01-29 NOTE — TELEPHONE ENCOUNTER
Called and spoke with patient's son's significant other who is in health care. Patient with headache, neck pain, nausea that gets worse when she sits up or stands up. Symptoms improve when she lies down. Has a hard time keeping down fluids without n/v.  No

## 2021-01-29 NOTE — TELEPHONE ENCOUNTER
Patient's son Ayleen Aguirre) called in stating that patient took her morning round of medications since having sx and she is stating that she feels completely awful which is not usual for her.      Patient also has had a terrible headache which is also out of the

## 2021-01-31 ENCOUNTER — TELEPHONE (OUTPATIENT)
Dept: ORTHOPEDICS CLINIC | Facility: CLINIC | Age: 56
End: 2021-01-31

## 2021-01-31 NOTE — TELEPHONE ENCOUNTER
Called again - patient staying about the same. Symptoms OK when she is lying down, she feels good in the morning, but as soon as she gets up she feels n/v and headache. Vitals have been stable, no fever, and no pain or swelling around hip.   She did get zof

## 2021-02-01 ENCOUNTER — OFFICE VISIT (OUTPATIENT)
Dept: PHYSICAL THERAPY | Age: 56
End: 2021-02-01
Attending: ORTHOPAEDIC SURGERY
Payer: COMMERCIAL

## 2021-02-01 ENCOUNTER — TELEPHONE (OUTPATIENT)
Dept: ORTHOPEDICS CLINIC | Facility: CLINIC | Age: 56
End: 2021-02-01

## 2021-02-01 DIAGNOSIS — M16.11 PRIMARY OSTEOARTHRITIS OF RIGHT HIP: ICD-10-CM

## 2021-02-01 PROCEDURE — 97110 THERAPEUTIC EXERCISES: CPT

## 2021-02-01 PROCEDURE — 97112 NEUROMUSCULAR REEDUCATION: CPT

## 2021-02-01 PROCEDURE — 97116 GAIT TRAINING THERAPY: CPT

## 2021-02-01 NOTE — PROGRESS NOTES
Dx: s/p R JANIS (anterior approach) 1/26/21          Insurance (Authorized # of Visits):  Delgado Tucker Physician: Dr. Alyssa Monroe    Next MD visit:2/10/21   Fall Risk: standard         Precautions: Anterior approach (no ER, ABD, hyperext, no c risk of falls on uneven surfaces such as grass and over snowy surfaces  · Pt will perform TUG in <14 seconds without AD demonstrating improved gait speed for improved participation in ADL such as community ambulation and grocery shopping  · Pt will be able

## 2021-02-01 NOTE — TELEPHONE ENCOUNTER
Spoke w Esteban Wyman over the phone today  Patient did a little better yesterday afternoon/evening - was able to get up and ambulate without much nausea and no vomiting  Hasn't touched base w the patient today yet  Advised that I spoke with Anesthesia on call who

## 2021-02-03 ENCOUNTER — OFFICE VISIT (OUTPATIENT)
Dept: PHYSICAL THERAPY | Age: 56
End: 2021-02-03
Attending: ORTHOPAEDIC SURGERY
Payer: COMMERCIAL

## 2021-02-03 DIAGNOSIS — M16.11 PRIMARY OSTEOARTHRITIS OF RIGHT HIP: ICD-10-CM

## 2021-02-03 PROCEDURE — 97116 GAIT TRAINING THERAPY: CPT

## 2021-02-03 PROCEDURE — 97110 THERAPEUTIC EXERCISES: CPT

## 2021-02-03 NOTE — PROGRESS NOTES
Dx: s/p R JANIS (anterior approach) 1/26/21          Insurance (Authorized # of Visits):  Chase Vick Physician: Dr. Marianne Daniel    Next MD visit:2/10/21   Fall Risk: standard         Precautions: Anterior approach (no ER, ABD, hyperext, no c community ambulation and grocery shopping  · Pt will be able to squat to  light objects around the house with <3/10 hip pain   · Pt will be independent and compliant with comprehensive HEP to maintain progress achieved in PT -good progress    Plan:

## 2021-02-08 ENCOUNTER — OFFICE VISIT (OUTPATIENT)
Dept: PHYSICAL THERAPY | Age: 56
End: 2021-02-08
Attending: ORTHOPAEDIC SURGERY
Payer: COMMERCIAL

## 2021-02-08 DIAGNOSIS — M16.11 PRIMARY OSTEOARTHRITIS OF RIGHT HIP: ICD-10-CM

## 2021-02-08 PROCEDURE — 97110 THERAPEUTIC EXERCISES: CPT

## 2021-02-08 PROCEDURE — 97112 NEUROMUSCULAR REEDUCATION: CPT

## 2021-02-08 NOTE — PROGRESS NOTES
Dx: s/p R JANIS (anterior approach) 1/26/21          Insurance (Authorized # of Visits):  Nhan Duron Physician: Dr. Laureen Anders    Next MD visit:2/10/21   Fall Risk: standard         Precautions: Anterior approach (no ER, ABD, hyperext, no c seconds without AD demonstrating improved gait speed for improved participation in ADL such as community ambulation and grocery shopping - MET  · Pt will be able to squat to  light objects around the house with <3/10 hip pain - MET  · Pt will be ind cone  x6       Manual Therapy   STM R hip for edema control x5 min  PROM R hip flex (to 90), IR to tolerance x4 min  Manual Therapy   STM R hip for edema control x8 min  PROM R hip flex (to 90), IR to tolerance x4 min  Manual Therapy   STM R hip for

## 2021-02-10 ENCOUNTER — APPOINTMENT (OUTPATIENT)
Dept: PHYSICAL THERAPY | Age: 56
End: 2021-02-10
Attending: ORTHOPAEDIC SURGERY
Payer: COMMERCIAL

## 2021-02-10 ENCOUNTER — OFFICE VISIT (OUTPATIENT)
Dept: ORTHOPEDICS CLINIC | Facility: CLINIC | Age: 56
End: 2021-02-10
Payer: COMMERCIAL

## 2021-02-10 VITALS — OXYGEN SATURATION: 99 % | HEART RATE: 86 BPM

## 2021-02-10 DIAGNOSIS — Z96.641 STATUS POST RIGHT HIP REPLACEMENT: Primary | ICD-10-CM

## 2021-02-10 PROCEDURE — 99024 POSTOP FOLLOW-UP VISIT: CPT | Performed by: ORTHOPAEDIC SURGERY

## 2021-02-10 NOTE — PROGRESS NOTES
[facility] Progress Note    Subjective: Patient returns to clinic 2 weeks postop from anterior right total hip arthroplasty. Reports that she is doing very well. She has been taking tramadol only as needed, no oxycodone.   Walking with a cane, but f

## 2021-02-11 ENCOUNTER — OFFICE VISIT (OUTPATIENT)
Dept: PHYSICAL THERAPY | Age: 56
End: 2021-02-11
Attending: ORTHOPAEDIC SURGERY
Payer: COMMERCIAL

## 2021-02-11 PROCEDURE — 97110 THERAPEUTIC EXERCISES: CPT

## 2021-02-11 PROCEDURE — 97112 NEUROMUSCULAR REEDUCATION: CPT

## 2021-02-11 NOTE — PROGRESS NOTES
Dx: s/p R JANIS (anterior approach) 1/26/21          Insurance (Authorized # of Visits):  [facility] Physician: Dr. Jessica Styles    Next MD visit:2/10/21   Fall Risk: standard         Precautions: Anterior approach (no ER, ABD, hyperext, no c ambulation and grocery shopping - MET  · Pt will be able to squat to  light objects around the house with <3/10 hip pain - MET  · Pt will be independent and compliant with comprehensive HEP to maintain progress achieved in PT -good progress    Plan: and over (2) x2 laps with SPC (SBA)  Gait Training  TUG x2 with SPC, x1 no AD    Gait with cone  x6   -    Manual Therapy   STM R hip for edema control x5 min  PROM R hip flex (to 90), IR to tolerance x4 min  Manual Therapy   STM R hip for edema con

## 2021-02-15 ENCOUNTER — OFFICE VISIT (OUTPATIENT)
Dept: PHYSICAL THERAPY | Age: 56
End: 2021-02-15
Attending: ORTHOPAEDIC SURGERY
Payer: COMMERCIAL

## 2021-02-15 DIAGNOSIS — M16.11 PRIMARY OSTEOARTHRITIS OF RIGHT HIP: ICD-10-CM

## 2021-02-15 PROCEDURE — 97112 NEUROMUSCULAR REEDUCATION: CPT

## 2021-02-15 PROCEDURE — 97110 THERAPEUTIC EXERCISES: CPT

## 2021-02-15 NOTE — PROGRESS NOTES
Dx: s/p R JANIS (anterior approach) 1/26/21          Insurance (Authorized # of Visits):  Ian Tucker Physician: Dr. Antonino Og    Next MD visit: 3/10/21   Fall Risk: standard         Precautions: Anterior approach (no ER, ABD, hyperext, no surfaces such as grass and over snowy surfaces -MET   · Pt will perform TUG in <14 seconds without AD demonstrating improved gait speed for improved participation in ADL such as community ambulation and grocery shopping - MET  · Pt will be able to squat to cross-over) x20 ft (good)  Gait with cone tap x1 lap  Neuro Re-ed  Slow march in parallel bars 4 laps (good) Neuro Re-ed  BOSU black up balance x30s  -with ball toss 2x5 ea (fair R)  Slow march x50 ft (good)  Tandem gait (no cross-over) x50 ft   -retro x50

## 2021-02-17 ENCOUNTER — OFFICE VISIT (OUTPATIENT)
Dept: PHYSICAL THERAPY | Age: 56
End: 2021-02-17
Attending: ORTHOPAEDIC SURGERY
Payer: COMMERCIAL

## 2021-02-17 DIAGNOSIS — M16.11 PRIMARY OSTEOARTHRITIS OF RIGHT HIP: ICD-10-CM

## 2021-02-17 PROCEDURE — 97116 GAIT TRAINING THERAPY: CPT

## 2021-02-17 PROCEDURE — 97110 THERAPEUTIC EXERCISES: CPT

## 2021-02-17 PROCEDURE — 97112 NEUROMUSCULAR REEDUCATION: CPT

## 2021-02-17 PROCEDURE — 97140 MANUAL THERAPY 1/> REGIONS: CPT

## 2021-02-17 NOTE — PROGRESS NOTES
Dx: s/p R JANIS (anterior approach) 1/26/21          Insurance (Authorized # of Visits):  Ale Wayne Physician: Dr. Reese Diehl MD visit: 3/10/21   Fall Risk: standard         Precautions: Anterior approach (no ER, ABD, hyperext, no strengthening as able    Date: 2/1/2021  TX#: 2/12 Date: 2/3/2021         TX#: 3/12 Date:2/8/2021                 TX#: 4/12 Date:2/11/21                 TX#: 5/12 Date:2/15/2021   Tx#: 6/12 Date:2/17/2021   Tx#: 7/12   Therex  NuStep L3 x5 min  glut sets 5 x30s  -with ball toss 2x5 ea (fair R)  Slow march x50 ft (good)  Tandem gait (no cross-over) x50 ft   -retro x50 ft   BOSU mini-lunge no UE x10 Neuro Re-ed  BOSU  Stand for 30 sec  Mini-squats 2x10        Gait Training  Gait with SPC x150 ft (SBA for seque

## 2021-02-18 ENCOUNTER — TELEPHONE (OUTPATIENT)
Dept: ORTHOPEDICS CLINIC | Facility: CLINIC | Age: 56
End: 2021-02-18

## 2021-02-18 RX ORDER — TRAMADOL HYDROCHLORIDE 50 MG/1
50 TABLET ORAL EVERY 8 HOURS PRN
Qty: 30 TABLET | Refills: 0 | Status: SHIPPED | OUTPATIENT
Start: 2021-02-18 | End: 2021-03-24

## 2021-02-18 NOTE — TELEPHONE ENCOUNTER
Patient's son called requesting a Tramadol refill for his mother. Sandee Boudreaux had right hip replacement surgery on 2/10/21 with Dr Sari Mercer and she is having trouble sleeping at night due to the pain. Send to the [facility] in [place] on file.

## 2021-02-22 ENCOUNTER — OFFICE VISIT (OUTPATIENT)
Dept: PHYSICAL THERAPY | Age: 56
End: 2021-02-22
Attending: ORTHOPAEDIC SURGERY
Payer: COMMERCIAL

## 2021-02-22 DIAGNOSIS — M16.11 PRIMARY OSTEOARTHRITIS OF RIGHT HIP: ICD-10-CM

## 2021-02-22 PROCEDURE — 97110 THERAPEUTIC EXERCISES: CPT

## 2021-02-22 PROCEDURE — 97112 NEUROMUSCULAR REEDUCATION: CPT

## 2021-02-22 NOTE — PROGRESS NOTES
Dx: s/p R JANIS (anterior approach) 1/26/21          Insurance (Authorized # of Visits):  Morales Ospina Physician: Dr. Watts Falling    Next MD visit: 3/10/21   Fall Risk: standard         Precautions: Anterior approach (no ER, ABD, hyperext, no over snowy surfaces -MET   · Pt will perform TUG in <14 seconds without AD demonstrating improved gait speed for improved participation in ADL such as community ambulation and grocery shopping - MET  · Pt will be able to squat to  light objects arou Therex  Nustep L5 x6 min  Single leg heel raises 2x10 ea (UE support)  Single leg squats 2x10 (UE support)  SLS 30 sec ea (good)    Bridge to neutral 10x  Bridge to neutral with SLR 2x10     Neuro Re-ed  Airex rhomberg stance x30s  -with ball toss x10 (goo single leg heel raises, bridges, SLS, squats    Charges: therex x2, neuro re-ed x1   Total Timed Treatment: 45 min  Total Treatment Time: 45 min

## 2021-02-24 ENCOUNTER — OFFICE VISIT (OUTPATIENT)
Dept: PHYSICAL THERAPY | Age: 56
End: 2021-02-24
Attending: ORTHOPAEDIC SURGERY
Payer: COMMERCIAL

## 2021-02-24 DIAGNOSIS — M16.11 PRIMARY OSTEOARTHRITIS OF RIGHT HIP: ICD-10-CM

## 2021-02-24 PROCEDURE — 97112 NEUROMUSCULAR REEDUCATION: CPT

## 2021-02-24 PROCEDURE — 97110 THERAPEUTIC EXERCISES: CPT

## 2021-02-24 PROCEDURE — 97140 MANUAL THERAPY 1/> REGIONS: CPT

## 2021-02-24 NOTE — PROGRESS NOTES
Dx: s/p R JANIS (anterior approach) 1/26/21          Insurance (Authorized # of Visits):  Kiel King Physician: Dr. Arin Nicolas    Next MD visit: 3/10/21   Fall Risk: standard         Precautions: Anterior approach (no ER, ABD, hyperext, no will be independent and compliant with comprehensive HEP to maintain progress achieved in PT -good progress    Plan: Progress balance    Date: 2/1/2021  TX#: 2/12 Date: 2/3/2021         TX#: 3/12 Date:2/8/2021                 TX#: 4/12 Date:2/11/21 rhomberg stance x30s  -with ball toss x10 (good)  airex slow march x20   SLS cone tap (1) x10 ea no UE    Neuro Re-ed  airex slow march x20   SLS on airex cone tap (1) 2x10 ea no UE  Neuro Re-ed  SLS x2 trials ea  -with ball toss 2x5 ea (fair R)  Slow colten single leg heel raises, bridges, SLS, squats    Charges:  Therex x1 Neuro re-ed x1  Manual x1   Total Timed Treatment: 45 min  Total Treatment Time: 45 min

## 2021-03-02 ENCOUNTER — APPOINTMENT (OUTPATIENT)
Dept: PHYSICAL THERAPY | Age: 56
End: 2021-03-02
Attending: ORTHOPAEDIC SURGERY
Payer: COMMERCIAL

## 2021-03-04 ENCOUNTER — OFFICE VISIT (OUTPATIENT)
Dept: PHYSICAL THERAPY | Age: 56
End: 2021-03-04
Attending: ORTHOPAEDIC SURGERY
Payer: COMMERCIAL

## 2021-03-04 DIAGNOSIS — M16.11 PRIMARY OSTEOARTHRITIS OF RIGHT HIP: ICD-10-CM

## 2021-03-04 PROCEDURE — 97110 THERAPEUTIC EXERCISES: CPT

## 2021-03-04 PROCEDURE — 97140 MANUAL THERAPY 1/> REGIONS: CPT

## 2021-03-04 NOTE — PROGRESS NOTES
Dx: s/p R JANIS (anterior approach) 1/26/21          Insurance (Authorized # of Visits):  [facility] Physician: Dr. Cecilio Alfredo    Next MD visit: 3/10/21   Fall Risk: standard         Precautions: Anterior approach (no ER, ABD, hyperext, no to 5/5 to be able to ambulate >30 minutes without AD or antalgia (when appropriate per post-op precautions) - good progress with gait  · Pt will demonstrate improved SLS to >10 seconds DANIELE to promote safety and decrease risk of falls on uneven surfaces suc support)  SLS 30 sec ea (good)    Bridge to neutral 10x  Bridge to neutral with SLR 2x10   Therex  Nustep L6 x3 min  Shuttle L6 GTB 2x10  Treadmill 2.5 mph inclined x3 5 min.    Therex  Nustep L5 x3 min  Standing YTB DANIEEL   - hip flexion 2x10   - hip extensi

## 2021-03-08 ENCOUNTER — OFFICE VISIT (OUTPATIENT)
Dept: PHYSICAL THERAPY | Age: 56
End: 2021-03-08
Attending: ORTHOPAEDIC SURGERY
Payer: COMMERCIAL

## 2021-03-08 DIAGNOSIS — M16.11 PRIMARY OSTEOARTHRITIS OF RIGHT HIP: ICD-10-CM

## 2021-03-08 PROCEDURE — 97110 THERAPEUTIC EXERCISES: CPT

## 2021-03-08 PROCEDURE — 97140 MANUAL THERAPY 1/> REGIONS: CPT

## 2021-03-08 NOTE — PROGRESS NOTES
Progress/Discharge Summary  Pt has attended 11 visits in Physical Therapy.    Dx: s/p R JANIS (anterior approach) 1/26/21          Insurance (Authorized # of Visits):  [address] [facility] Physician: Dr. Justine Diehl MD visit: 3/10/21   Fall R don/doff shoes and perform car transfers without difficulty -progress; post-op precautions discontinued at ~6 weeks  · Pt will improve hip ABD and ER strength to 5/5 to be able to ambulate >30 minutes without AD or antalgia - good progress with gait; post- R    Single leg heel raises  2x15 ea  First set with DANIELE UE  2nd set with Unilateral UE    Deadlift  2x10 ea   Therex  Nustep L5 x6 min  Single leg heel raises 2x10 ea (UE support)  Single leg squats 2x10 (UE support)  SLS 30 sec ea (good)    Bridge to allen piriformis, and hamstring attachment 10 min   HEP: SLR, single leg heel raises, bridges, SLS, squats; YTB hip flexion, abduction, extension, YTB lateral steps, YTB monster walks, seated hip flexor stretch    Charges:  Therex x2 Manual x1   Total Timed Treat

## 2021-03-10 ENCOUNTER — APPOINTMENT (OUTPATIENT)
Dept: PHYSICAL THERAPY | Age: 56
End: 2021-03-10
Attending: ORTHOPAEDIC SURGERY
Payer: COMMERCIAL

## 2021-03-12 ENCOUNTER — OFFICE VISIT (OUTPATIENT)
Dept: ORTHOPEDICS CLINIC | Facility: CLINIC | Age: 56
End: 2021-03-12
Payer: COMMERCIAL

## 2021-03-12 ENCOUNTER — HOSPITAL ENCOUNTER (OUTPATIENT)
Dept: GENERAL RADIOLOGY | Age: 56
Discharge: HOME | End: 2021-03-12
Attending: ORTHOPAEDIC SURGERY
Payer: COMMERCIAL

## 2021-03-12 VITALS — OXYGEN SATURATION: 99 % | HEART RATE: 79 BPM

## 2021-03-12 DIAGNOSIS — Z96.641 STATUS POST RIGHT HIP REPLACEMENT: ICD-10-CM

## 2021-03-12 DIAGNOSIS — Z96.641 STATUS POST RIGHT HIP REPLACEMENT: Primary | ICD-10-CM

## 2021-03-12 PROCEDURE — 73502 X-RAY EXAM HIP UNI 2-3 VIEWS: CPT | Performed by: ORTHOPAEDIC SURGERY

## 2021-03-12 PROCEDURE — 99024 POSTOP FOLLOW-UP VISIT: CPT | Performed by: ORTHOPAEDIC SURGERY

## 2021-03-12 NOTE — PROGRESS NOTES
[facility] Progress Note    Subjective: Patient returns to clinic 6 weeks postop status post anterior right total hip arthroplasty. Reports that she is doing well. She denies any pain in the hip.   She does take tramadol up to 2 times a day prior to doing well and can follow-up at 1 year from date of surgery with repeat x-rays. Activities as tolerated, no restrictions, no restrictions on the wound.     Esequiel Allen MD, [address] [place] [address] Orthopedic Surgery  Phone [phone]  Fax

## 2021-03-15 ENCOUNTER — ORDER TRANSCRIPTION (OUTPATIENT)
Dept: PHYSICAL THERAPY | Facility: HOSPITAL | Age: 56
End: 2021-03-15

## 2021-03-15 ENCOUNTER — OFFICE VISIT (OUTPATIENT)
Dept: PHYSICAL THERAPY | Age: 56
End: 2021-03-15
Attending: ORTHOPAEDIC SURGERY
Payer: COMMERCIAL

## 2021-03-15 DIAGNOSIS — M70.62 TROCHANTERIC BURSITIS OF LEFT HIP: Primary | ICD-10-CM

## 2021-03-15 DIAGNOSIS — M16.11 PRIMARY OSTEOARTHRITIS OF RIGHT HIP: ICD-10-CM

## 2021-03-15 DIAGNOSIS — M70.72 BURSITIS OF LEFT HIP: Primary | ICD-10-CM

## 2021-03-15 PROCEDURE — 97112 NEUROMUSCULAR REEDUCATION: CPT

## 2021-03-15 PROCEDURE — 97110 THERAPEUTIC EXERCISES: CPT

## 2021-03-15 PROCEDURE — 97140 MANUAL THERAPY 1/> REGIONS: CPT

## 2021-03-15 NOTE — PROGRESS NOTES
Discharge Summary  Pt has attended 12 visits in Physical Therapy.    Dx: s/p R JANIS (anterior approach) 1/26/21          Insurance (Authorized # of Visits):  [facility]ny [facility] Physician: Dr. Cecilio Alfredo    Next MD visit: 3/10/21   Fall Risk: stand -functionally MET  · Pt will improve hip ABD and ER strength to 5/5 to be able to ambulate >30 minutes without AD or antalgia - functionally MET  · Pt will demonstrate improved SLS to >10 seconds DANIELE to promote safety and decrease risk of falls on uneven s YTB, L none)   - hip flexion 10x   - hip extension 10x   - hip abduction 10x  Seated hip flexor stretch 30 sec ea    Bridges 10x    R SLR 10x (discontinued)    Measured DANIELE hamstring flexibility       Neuro Re-ed  Single leg clock reach 15x standing on R (

## 2021-03-17 ENCOUNTER — APPOINTMENT (OUTPATIENT)
Dept: PHYSICAL THERAPY | Age: 56
End: 2021-03-17
Attending: ORTHOPAEDIC SURGERY
Payer: COMMERCIAL

## 2021-03-17 ENCOUNTER — OFFICE VISIT (OUTPATIENT)
Dept: PHYSICAL THERAPY | Age: 56
End: 2021-03-17
Attending: ORTHOPAEDIC SURGERY
Payer: COMMERCIAL

## 2021-03-17 ENCOUNTER — TELEPHONE (OUTPATIENT)
Dept: PHYSICAL THERAPY | Facility: HOSPITAL | Age: 56
End: 2021-03-17

## 2021-03-17 DIAGNOSIS — M70.62 TROCHANTERIC BURSITIS OF LEFT HIP: ICD-10-CM

## 2021-03-17 PROCEDURE — 97161 PT EVAL LOW COMPLEX 20 MIN: CPT

## 2021-03-17 PROCEDURE — 97140 MANUAL THERAPY 1/> REGIONS: CPT

## 2021-03-17 NOTE — PROGRESS NOTES
POST-OP HIP EVALUATION:   Referring Physician: Dr. Marylu Zuniga  Diagnosis: L hip bursitis and intra-articular pathology Date of Service: 3/17/2021     PATIENT SUMMARY   Nash Gardiner is a 63year old female who presents to therapy today with complaints o test, relief with long axis distraction and lateral hip distraction, pain with weight bearing and stairs - suggestive of intraarticular pathology (possibly OA).  She also presents with increased muscle tension in her L proximal thigh, decreased strength, an recommendations for modalities as needed [ice/heat]. Patient was instructed in and issued a HEP for: R JANIS prior to this eval. Instructed to continue hip flexor stretch, bridges, and squats for DANIELE hips.  New HEP for L hip includes: prone quad stretch with Date____________________    Certification From: 7/09/1054  To:6/15/2021

## 2021-03-18 ENCOUNTER — TELEPHONE (OUTPATIENT)
Dept: ORTHOPEDICS CLINIC | Facility: CLINIC | Age: 56
End: 2021-03-18

## 2021-03-18 NOTE — TELEPHONE ENCOUNTER
Patient's son Last Fairbanks called he was told by darron to ask for a refill of her medicine-Tremedol 50mg tabs. Please send it to:    [address] and the    Phone: [phone]    Patient's son can be reached at [phone]

## 2021-03-22 ENCOUNTER — APPOINTMENT (OUTPATIENT)
Dept: PHYSICAL THERAPY | Age: 56
End: 2021-03-22
Attending: ORTHOPAEDIC SURGERY
Payer: COMMERCIAL

## 2021-03-22 ENCOUNTER — TELEPHONE (OUTPATIENT)
Dept: ORTHOPEDICS CLINIC | Facility: CLINIC | Age: 56
End: 2021-03-22

## 2021-03-22 RX ORDER — MELOXICAM 7.5 MG/1
7.5 TABLET ORAL DAILY
Qty: 30 TABLET | Refills: 0 | Status: SHIPPED | OUTPATIENT
Start: 2021-03-22 | End: 2021-04-21

## 2021-03-22 NOTE — TELEPHONE ENCOUNTER
Returned Junaid's call and informed him of DR. Antonino Og sending in Meloxicam to the pharmacy and his instructions son understood.

## 2021-03-22 NOTE — TELEPHONE ENCOUNTER
Following PT, the patient is now limping and is in pain.  She thinks that her foot was lifted too high during therap by the therpaist.  She scheduled an appt (the earliest I could schedule her for was on Wednesday), but they are wondering if anything can be

## 2021-03-22 NOTE — TELEPHONE ENCOUNTER
meloxicam 7.5mg tablet ordered to patient's pharmacy - advise to take once daily in the morning.  If not providing sufficient relief with just one, she can take a second one for a total of 15mg once daily

## 2021-03-22 NOTE — TELEPHONE ENCOUNTER
Patient's son Magdalene Goldman has questions regarding the medication ordered by Dr Mihir Palomo. Please call.

## 2021-03-24 ENCOUNTER — OFFICE VISIT (OUTPATIENT)
Dept: ORTHOPEDICS CLINIC | Facility: CLINIC | Age: 56
End: 2021-03-24
Payer: COMMERCIAL

## 2021-03-24 ENCOUNTER — OFFICE VISIT (OUTPATIENT)
Dept: PHYSICAL THERAPY | Age: 56
End: 2021-03-24
Attending: ORTHOPAEDIC SURGERY
Payer: COMMERCIAL

## 2021-03-24 ENCOUNTER — APPOINTMENT (OUTPATIENT)
Dept: PHYSICAL THERAPY | Age: 56
End: 2021-03-24
Attending: ORTHOPAEDIC SURGERY
Payer: COMMERCIAL

## 2021-03-24 VITALS — HEART RATE: 83 BPM | OXYGEN SATURATION: 99 %

## 2021-03-24 DIAGNOSIS — R52 RADIATING PAIN: Primary | ICD-10-CM

## 2021-03-24 PROCEDURE — 99024 POSTOP FOLLOW-UP VISIT: CPT | Performed by: ORTHOPAEDIC SURGERY

## 2021-03-24 PROCEDURE — 97140 MANUAL THERAPY 1/> REGIONS: CPT

## 2021-03-24 RX ORDER — METHYLPREDNISOLONE 4 MG/1
TABLET ORAL
Qty: 1 KIT | Refills: 0 | Status: SHIPPED | OUTPATIENT
Start: 2021-03-24

## 2021-03-24 NOTE — PROGRESS NOTES
Dx: L hip pain and lumbar radiculopathy         Insurance (Authorized # of Visits):  Apple Finney (8)           Authorizing Physician: Dr. Jaclyn Paredes  Next MD visit: none scheduled  Fall Risk: standard         Precautions: n/a             Subjective: [place] rep possibly continue lympathic drainage or initiate ace wrapping for edema control. Date: 3/24/2021  TX#: 2/8 Date:                 TX#: 3/ Date:                 TX#: 4/ Date:                 TX#: 5/ Date:    Tx#: 6/   Therex  Treadmill 1.5 mph 4 min DANIELE UE s

## 2021-03-24 NOTE — PROGRESS NOTES
[facility] Progress Note    Subjective: Patient presents today for evaluation of new radiating right lower extremity pain.   She reports that she has been working with physical therapy, and that they did a stretch that straighten her knee and flexed h pain.    Alvester Bernheim, MD, [address] Orthopedic Surgery  Phone [phone]  Fax [phone]

## 2021-03-25 ENCOUNTER — OFFICE VISIT (OUTPATIENT)
Dept: PHYSICAL THERAPY | Age: 56
End: 2021-03-25
Attending: ORTHOPAEDIC SURGERY
Payer: COMMERCIAL

## 2021-03-25 PROCEDURE — 97110 THERAPEUTIC EXERCISES: CPT

## 2021-03-25 NOTE — PROGRESS NOTES
Dx: L hip pain and lumbar radiculopathy         Insurance (Authorized # of Visits):  Promise Shepard (8)           Authorizing Physician: Dr. Reese Castano  Next MD visit: none scheduled  Fall Risk: standard         Precautions: n/a             Subjective: [place] rep 2x10 ea  Side-lying hip abduction 2x10 ea  Clams 2x10 ea  L piriformis stretch 2x30s      Neuro Re-ed  R sciatic nerve glides 5x Neuro Re-ed  Sciatic nerve glides 15x ea  2 laps in // bars with airex beam      Manual  Lymphatic drainage of R LE x25 min Man

## 2021-03-30 ENCOUNTER — OFFICE VISIT (OUTPATIENT)
Dept: PHYSICAL THERAPY | Age: 56
End: 2021-03-30
Attending: SURGERY
Payer: COMMERCIAL

## 2021-03-30 PROCEDURE — 97140 MANUAL THERAPY 1/> REGIONS: CPT

## 2021-03-30 PROCEDURE — 97110 THERAPEUTIC EXERCISES: CPT

## 2021-03-30 NOTE — PROGRESS NOTES
Dx: L hip pain and lumbar radiculopathy         Insurance (Authorized # of Visits):  Kyle Rhodes (8)             Progress/DischargeSummary  Pt has attended 4 visits in Physical Therapy.    Authorizing Physician: Dr. Alyssa Monroe  Next MD visit: none scheduled  [facility] weeks. Patient/Family/Caregiver was advised of these findings, precautions, and treatment options and has agreed to actively participate in planning and for this course of care.     Thank you for your referral. If you have any questions, please contact m

## 2021-03-31 ENCOUNTER — APPOINTMENT (OUTPATIENT)
Dept: PHYSICAL THERAPY | Age: 56
End: 2021-03-31
Attending: ORTHOPAEDIC SURGERY
Payer: COMMERCIAL

## 2021-04-01 ENCOUNTER — APPOINTMENT (OUTPATIENT)
Dept: PHYSICAL THERAPY | Age: 56
End: 2021-04-01
Payer: COMMERCIAL

## 2021-04-06 ENCOUNTER — APPOINTMENT (OUTPATIENT)
Dept: PHYSICAL THERAPY | Age: 56
End: 2021-04-06
Payer: COMMERCIAL

## 2021-04-08 ENCOUNTER — APPOINTMENT (OUTPATIENT)
Dept: PHYSICAL THERAPY | Age: 56
End: 2021-04-08
Payer: COMMERCIAL

## 2021-04-13 ENCOUNTER — APPOINTMENT (OUTPATIENT)
Dept: PHYSICAL THERAPY | Age: 56
End: 2021-04-13
Payer: COMMERCIAL

## 2021-04-15 ENCOUNTER — APPOINTMENT (OUTPATIENT)
Dept: PHYSICAL THERAPY | Age: 56
End: 2021-04-15
Payer: COMMERCIAL

## 2021-04-20 ENCOUNTER — APPOINTMENT (OUTPATIENT)
Dept: PHYSICAL THERAPY | Age: 56
End: 2021-04-20
Payer: COMMERCIAL

## 2021-04-22 ENCOUNTER — APPOINTMENT (OUTPATIENT)
Dept: PHYSICAL THERAPY | Age: 56
End: 2021-04-22
Payer: COMMERCIAL

## 2021-11-29 ENCOUNTER — TELEPHONE (OUTPATIENT)
Dept: ORTHOPEDICS CLINIC | Facility: CLINIC | Age: 56
End: 2021-11-29

## 2021-11-29 NOTE — TELEPHONE ENCOUNTER
Agree with your recommendations - good call to recommend NSAIDs rather than/in addition to Tylenol as they are more effective

## 2021-11-29 NOTE — TELEPHONE ENCOUNTER
Spoke with patient's son who stated that his mother, over the weekend, bent down to put her shoes on and is having sharp, severe pain radiating from the side of the hip down to the knee along with swelling at the hip.  Pt's son stated that his mom is taking

## 2021-11-29 NOTE — TELEPHONE ENCOUNTER
Pt son Sav Bland called and is stating that the pt has a swollen surgery site and has been  painful for 2 days now. Pt had Rt hip surgery last December. Please advice the pt. Call Sav Bland [phone] because pt need an .

## 2021-11-29 NOTE — TELEPHONE ENCOUNTER
Verified voicemail reached. Attempted to call Kerri Butler son [facility] regarding request for callback to our office. Reached voicemail, left voicemail and provided a detail message with my call back contact information.

## 2022-11-08 ENCOUNTER — TELEPHONE (OUTPATIENT)
Dept: ORTHOPEDICS CLINIC | Facility: CLINIC | Age: 57
End: 2022-11-08

## 2022-11-08 DIAGNOSIS — M25.551 RIGHT HIP PAIN: Primary | ICD-10-CM

## (undated) DEVICE — DERMABOND LIQUID ADHESIVE

## (undated) DEVICE — Device: Brand: STABLECUT®

## (undated) DEVICE — KENDALL SCD EXPRESS SLEEVES, KNEE LENGTH, MEDIUM: Brand: KENDALL SCD

## (undated) DEVICE — SUTURE VICRYL 0 CT-1

## (undated) DEVICE — TOTAL HIP CDS: Brand: MEDLINE INDUSTRIES, INC.

## (undated) DEVICE — GOWN SURG AERO CHROME XXL

## (undated) DEVICE — TIP CLEANER: Brand: VALLEYLAB

## (undated) DEVICE — SUTURE SILK 0

## (undated) DEVICE — HOOD: Brand: FLYTE

## (undated) DEVICE — Device: Brand: BOOT LINER, DISPOSABLE

## (undated) DEVICE — BIPOLAR SEALER 23-112-1 AQM 6.0: Brand: AQUAMANTYS™

## (undated) DEVICE — BIT DRL 30MM 3.2MM RNGLC ACTB

## (undated) DEVICE — C-ARM: Brand: UNBRANDED

## (undated) DEVICE — STERILE POLYISOPRENE POWDER-FREE SURGICAL GLOVES WITH EMOLLIENT COATING: Brand: PROTEXIS

## (undated) DEVICE — LIGHT HANDLE

## (undated) DEVICE — SUTURE VICRYL 2-0 CP-1

## (undated) DEVICE — SUTURE ETHIBOND 5 CCS

## (undated) DEVICE — NEEDLE SPINAL 18X3-1/2 PINK.

## (undated) DEVICE — SYRINGE 30ML LL TIP

## (undated) DEVICE — Device

## (undated) DEVICE — BLADE ELECTRODE: Brand: EDGE

## (undated) DEVICE — STERILE POLYISOPRENE POWDER-FREE SURGICAL GLOVES: Brand: PROTEXIS

## (undated) DEVICE — 3M™ STERI-STRIP™ REINFORCED ADHESIVE SKIN CLOSURES, R1548, 1 IN X 5 IN (25 MM X 125 MM), 4 STRIPS/ENVELOPE: Brand: 3M™ STERI-STRIP™

## (undated) DEVICE — SUTURE VICRYL 2-0 FSL

## (undated) DEVICE — SUTURE MONOCRYL 4-0 PS-2

## (undated) DEVICE — POSITIONER OR KT PT CR

## (undated) DEVICE — HOOD, PEEL-AWAY: Brand: FLYTE

## (undated) DEVICE — SHEET,DRAPE,70X100,STERILE: Brand: MEDLINE

## (undated) NOTE — LETTER
Patient Name: Celestina Luna  YOB: 1965          MRN :  [id number]  Date:  3/8/2021  Referring Physician:  Joey Pinto    Progress/Discharge Summary  Pt has attended 11 visits in Physical Therapy.    Dx: s/p R JANIS (anterior approach) 1/26 performed with pain    Goals: (To be met in 12 visits)   · Pt will have improved hip AROM Flex to 100 deg and ABD to 30 deg to be able to don/doff shoes and perform car transfers without difficulty -progress; post-op precautions discontinued at ~6 weeks  ·

## (undated) NOTE — LETTER
[facility]  Phone: [phone]  OUTSIDE TESTING RESULT REQUEST      TO:   Dr. Jennifer Degroot Date: 1/7/21    FAX #: [phone]    Patient has an appointment with you on 1/15/2021   and will need testing done as liste

## (undated) NOTE — LETTER
Patient Name: Bulmaro Forbes  YOB: 1965          MRN :  [id number]  Date:  3/31/2021  Referring Physician:  Anya Fowler/Nancy  Pt has attended 4 visits in Physical Therapy.    Authorizing Physician: Dr. Guerda Montemayor will discharge in 2 weeks. Thank you for your referral. If you have any questions, please contact me at Dept: [phone].     Sincerely,  Electronically signed by therapist: DAYANNA Ramos    Certification From: 5/14/3979  To:6/28/2021